# Patient Record
Sex: FEMALE | Race: BLACK OR AFRICAN AMERICAN | NOT HISPANIC OR LATINO | Employment: UNEMPLOYED | URBAN - METROPOLITAN AREA
[De-identification: names, ages, dates, MRNs, and addresses within clinical notes are randomized per-mention and may not be internally consistent; named-entity substitution may affect disease eponyms.]

---

## 2019-02-05 ENCOUNTER — HOSPITAL ENCOUNTER (EMERGENCY)
Facility: MEDICAL CENTER | Age: 24
End: 2019-02-05
Attending: EMERGENCY MEDICINE
Payer: COMMERCIAL

## 2019-02-05 VITALS
HEIGHT: 60 IN | HEART RATE: 85 BPM | OXYGEN SATURATION: 100 % | RESPIRATION RATE: 16 BRPM | SYSTOLIC BLOOD PRESSURE: 111 MMHG | DIASTOLIC BLOOD PRESSURE: 65 MMHG | WEIGHT: 102.51 LBS | TEMPERATURE: 98.8 F | BODY MASS INDEX: 20.13 KG/M2

## 2019-02-05 DIAGNOSIS — K04.7 DENTAL ABSCESS: ICD-10-CM

## 2019-02-05 PROCEDURE — 99283 EMERGENCY DEPT VISIT LOW MDM: CPT

## 2019-02-05 RX ORDER — HYDROCODONE BITARTRATE AND ACETAMINOPHEN 5; 325 MG/1; MG/1
1 TABLET ORAL EVERY 4 HOURS PRN
Qty: 10 TAB | Refills: 0 | Status: SHIPPED | OUTPATIENT
Start: 2019-02-05 | End: 2019-02-10

## 2019-02-05 RX ORDER — PENICILLIN V POTASSIUM 500 MG/1
500 TABLET ORAL EVERY 6 HOURS
Qty: 40 TAB | Refills: 0 | Status: SHIPPED | OUTPATIENT
Start: 2019-02-05 | End: 2019-02-15

## 2019-02-05 NOTE — ED PROVIDER NOTES
"ED Provider Note    CHIEF COMPLAINT  Chief Complaint   Patient presents with   • Dental Pain   • Jaw Swelling       [facility]  Ra Weinstein is a 23 y.o. female who presents for evaluation of jaw swelling.  The patient presents with 2 days of progressive pain to the right lower jaw area.  Patient states that she has had problems with her teeth in the past but is \"afraid to go see a dentist\".  Patient denies associated: Fever, chills, URI symptoms, cardiorespiratory symptoms, gastrointestinal symptoms.  No other acute symptomatology or complaints.    REVIEW OF SYSTEMS  See HPI for further details.  She denies major health problems such as: Diabetes, seizures, cardiopulmonary disorders, gastrointestinal disorders.  She does relate a history of asthma.    PAST MEDICAL HISTORY  Past Medical History:   Diagnosis Date   • Asthma        FAMILY HISTORY  History reviewed. No pertinent family history.    SOCIAL HISTORY  Positive tobacco use; denies alcohol or drug use;    SURGICAL HISTORY  History reviewed. No pertinent surgical history.    CURRENT MEDICATIONS  See nurse's notes    ALLERGIES  No Known Allergies    PHYSICAL EXAM  VITAL SIGNS: /65   Pulse 85   Temp 37.1 °C (98.8 °F) (Temporal)   Resp 16   Ht 1.524 m (5')   Wt 46.5 kg (102 lb 8.2 oz)   SpO2 100%   BMI 20.02 kg/m²    Constitutional: 23-year-old female, well developed, Well nourished, No acute distress, Non-toxic appearance.   HENT: Normocephalic, Atraumatic, Nares:Clear, Oropharynx: moist, well hydrated, posterior pharynx:clear; the patient has swelling of the right lower jaw line area; the patient has tenderness to tooth #28 and 29 with associated gingival swelling but no fluctuance; no trismus; no sublingular edema;  Eyes: PERRL, EOMI, Conjunctiva normal, No discharge.   Neck: Normal range of motion, No tenderness, Supple, No stridor.   Lymphatic: No lymphadenopathy noted.   Cardiovascular: Regular rate and rhythm without mumurs, gallups, rubs "   Thorax & Lungs: Normal Equal breath sounds, No respiratory distress, No wheezing, no stridor, no rales. No chest tenderness.   Skin: Good skin turgor, pink, warm, dry. No rashes, petechiae, purpura. Normal capillary refill.   Neurologic: Alert & oriented x 3,  No gross focal deficits noted.   Psychiatric: Affect normal, Judgment normal, Mood normal.     COURSE & MEDICAL DECISION MAKING  Pertinent Labs & Imaging studies reviewed. (See chart for details)  Discussion: At this time, the patient has findings consistent with uncomplicated dental infection.  I see no evidence of deep space infection or sublingular infection.  I discussed the findings and treatment plan with the patient.  The patient lives in [place] is planning on returning home to have more definitive treatment initiated.  She indicates she is comfortable with this explanation disposition.    FINAL IMPRESSION  1. Dental abscess           PLAN  1.  Appropriate discharge instructions given  2.  Pen-Vee K 500 mg #40  3.  Lortab 5 mg #10;  databank reviewed; informed consent obtained;    Electronically signed by: Guy G Gansert, 2/5/2019 9:22 AM

## 2019-02-05 NOTE — ED NOTES
Discharge instructions given and signed by patient, narcotic form signed by patient, told to follow up with dentist/PCP or to return to ED for new or concerning symptoms

## 2019-02-05 NOTE — ED TRIAGE NOTES
Chief Complaint   Patient presents with   • Dental Pain   • Jaw Swelling   Pt to triage in NAD.  Pt reports she has a broken tooth.  Pt with right jaw swelling.  Pt able to swallow.  Managing secretions.  Pt reports she lives in [place] and has a dentist but is unable to get home due to snow.  Pt educated on triage process and instructed to notify triage RN of any change in status.

## 2019-02-07 ENCOUNTER — HOSPITAL ENCOUNTER (EMERGENCY)
Facility: MEDICAL CENTER | Age: 24
End: 2019-02-07
Attending: EMERGENCY MEDICINE
Payer: COMMERCIAL

## 2019-02-07 VITALS
DIASTOLIC BLOOD PRESSURE: 67 MMHG | HEIGHT: 60 IN | BODY MASS INDEX: 20.03 KG/M2 | WEIGHT: 102 LBS | OXYGEN SATURATION: 97 % | RESPIRATION RATE: 16 BRPM | HEART RATE: 87 BPM | TEMPERATURE: 98.8 F | SYSTOLIC BLOOD PRESSURE: 110 MMHG

## 2019-02-07 DIAGNOSIS — K08.89 PAIN, DENTAL: ICD-10-CM

## 2019-02-07 PROCEDURE — A9270 NON-COVERED ITEM OR SERVICE: HCPCS | Performed by: EMERGENCY MEDICINE

## 2019-02-07 PROCEDURE — 99283 EMERGENCY DEPT VISIT LOW MDM: CPT

## 2019-02-07 PROCEDURE — 700102 HCHG RX REV CODE 250 W/ 637 OVERRIDE(OP): Performed by: EMERGENCY MEDICINE

## 2019-02-07 RX ORDER — HYDROCODONE BITARTRATE AND ACETAMINOPHEN 5; 325 MG/1; MG/1
1 TABLET ORAL ONCE
Status: COMPLETED | OUTPATIENT
Start: 2019-02-07 | End: 2019-02-07

## 2019-02-07 RX ADMIN — HYDROCODONE BITARTRATE AND ACETAMINOPHEN 1 TABLET: 5; 325 TABLET ORAL at 06:10

## 2019-02-07 NOTE — ED TRIAGE NOTES
Ra Ledezma Js  23 y.o. female  Chief Complaint   Patient presents with   • Dental Pain     swelling to lower mandible, seen on 2/5 with Rx Abx, pt states was local to R side only, now there is swelling to both   /67   Pulse 87   Temp 37.1 °C (98.8 °F) (Temporal)   Resp 16   Ht 1.524 m (5')   Wt 46.3 kg (102 lb)   LMP 01/12/2019   SpO2 97%   BMI 19.92 kg/m²   =  Pt amb to triage with steady gait for above complaint. No other medical complaints, denies fever chills N/V/D  Pt is alert and oriented, speaking in full sentences, follows commands and responds appropriately to questions. NAD. Resp are even and unlabored.  Pt placed in lobby. Pt educated on triage process. Pt encouraged to alert staff for any changes.

## 2019-02-07 NOTE — ED PROVIDER NOTES
ED Provider Note      CHIEF COMPLAINT  Chief Complaint   Patient presents with   • Dental Pain     swelling to lower mandible, seen on 2/5 with Rx Abx, pt states was local to R side only, now there is swelling to both       HPI  Ra Weinstein is a 23 y.o. female who presents with dental pain.  She has had pain in her right jaw for a long time.  She has been applying over-the-counter filling to her right mandibular molar.  She developed swelling 2 days ago.  Was seen in the ER.  Placed on penicillin.  She took this yesterday.  This morning she woke up and felt like the swelling over the jaw was improved although she felt like her left lower tooth hurt as well.  She has not had fever.  No swelling underneath the mandible.  No difficulty breathing or swallowing.  Because of the pain she has not been brushing her teeth.  She is still drinking liquids and eating.  She has been afraid to see a dentist for some time, but she plans to see a dentist in [place] this week.    REVIEW OF SYSTEMS      See HPI    PAST MEDICAL HISTORY  Past Medical History:   Diagnosis Date   • Asthma        SOCIAL HISTORY  Social History   Substance Use Topics   • Smoking status: Light Tobacco Smoker     Types: Cigarettes   • Smokeless tobacco: Never Used   • Alcohol use No       ALLERGIES  No Known Allergies    PHYSICAL EXAM  VITAL SIGNS: /67   Pulse 87   Temp 37.1 °C (98.8 °F) (Temporal)   Resp 16   Ht 1.524 m (5')   Wt 46.3 kg (102 lb)   LMP 01/12/2019   SpO2 97%   BMI 19.92 kg/m²   Constitutional: Well developed, Well nourished, No acute distress, Non-toxic appearance.   HENT:  Atraumatic, Normocephalic. Bilateral external ears normal, there is swelling over the left side of the mandible.  There is tenderness of all her left mandibular teeth.  There is no discrete periapical abscess.  No tongue elevation.  There is no facial cellulitis.  Eyes: Grossly Normal inspection  Neck: Normal range of motion  Cardiovascular: Normal  heart rate  Thorax & Lungs: No respiratory distress  Skin: No rash.     COURSE & MEDICAL DECISION MAKING  Patient with dental caries with secondary infection.  She is only taken antibiotics for 1 day now.  She reports that she feels like the swelling is getting better.  She does not have sublingual swelling or ludwigs.  No systemic illness or toxicity.  No drainable abscess.  At this point the best course is to continue antibiotics and symptomatic management.  Advised Tylenol and/or ibuprofen as needed for pain.  Given one Norco while here.  She will continue her penicillin.  Apply warm compresses and gentle massage the area.  I have asked her to see a dentist within the next couple days to arrange for definitive care of her dental caries.  I cautioned her to return to the ER for fevers, skin rash, swelling underneath the tongue, worsening, not improving or concerned.      FINAL IMPRESSION  1. dental caries with secondary infection      This dictation was created using voice recognition software. The accuracy of the dictation is limited to the abilities of the software.   The nursing notes were reviewed and certain aspects of this information were incorporated into this note.      Electronically signed by: Nilson Argueta, 2/7/2019 9:13 AM

## 2019-10-03 ENCOUNTER — HOSPITAL ENCOUNTER (EMERGENCY)
Facility: MEDICAL CENTER | Age: 24
End: 2019-10-03
Attending: EMERGENCY MEDICINE
Payer: COMMERCIAL

## 2019-10-03 VITALS
BODY MASS INDEX: 26.19 KG/M2 | DIASTOLIC BLOOD PRESSURE: 69 MMHG | RESPIRATION RATE: 12 BRPM | WEIGHT: 133.38 LBS | HEART RATE: 93 BPM | HEIGHT: 60 IN | TEMPERATURE: 97.3 F | SYSTOLIC BLOOD PRESSURE: 106 MMHG | OXYGEN SATURATION: 100 %

## 2019-10-03 DIAGNOSIS — R09.81 NASAL CONGESTION: ICD-10-CM

## 2019-10-03 PROCEDURE — 99282 EMERGENCY DEPT VISIT SF MDM: CPT

## 2019-10-04 NOTE — ED NOTES
DC home with written and verbal instructions regarding f/u, activity.  Verbalized understanding, ambulated out.

## 2019-10-04 NOTE — ED PROVIDER NOTES
ER Provider Note     Scribed for Rigoberto Vance M.D. by Dequan Schneider. 10/3/2019, 8:07 PM.    Primary Care Provider: None noted  Means of Arrival: Walk-in   History obtained from: Patient  History limited by: None     CHIEF COMPLAINT  Chief Complaint   Patient presents with   • Cold Symptoms     runny nose, congestion. feels like her asthma flared up.    • Medication Refill     out of albuterol inhaler   • Pregnancy     36 weeks       HPI  Ra Weinstein is a 24 y.o. female who is 36 weeks pregnant and presents to the Emergency Department complaining of runny nose and nasal congestion onset a couple days ago. Patient states she has been having cold-like symptoms with a couple of days but it has been getting harder for her to breathe due to her nasal congestion. She denies any associated shortness of breath, wheezing, or fevers.  She also notes she would like a prescription refill for her albuterol inhaler, which she was prescribed a couple weeks ago for her asthma. However, patient reports her current symptoms are due to her nasal congestion and not from asthma exacerbation. She reports she is on her second pregnancy.    REVIEW OF SYSTEMS  See HPI for further details.     PAST MEDICAL HISTORY   has a past medical history of Asthma and Hypertension.    SURGICAL HISTORY  patient denies any surgical history    SOCIAL HISTORY  Social History     Tobacco Use   • Smoking status: Light Tobacco Smoker     Types: Cigarettes   • Smokeless tobacco: Never Used   Substance Use Topics   • Alcohol use: No   • Drug use: No      Social History     Substance and Sexual Activity   Drug Use No       FAMILY HISTORY  History reviewed. No pertinent family history.    CURRENT MEDICATIONS  Home Medications     Reviewed by Lorene Cortez R.N. (Registered Nurse) on 10/03/19 at [facility]  Med List Status: Partial   Medication Last Dose Status        Patient [place] Taking any Medications                       ALLERGIES  No Known  Allergies    PHYSICAL EXAM  VITAL SIGNS: /69   Pulse 93   Temp 36.3 °C (97.3 °F) (Temporal)   Resp 12   Ht 1.524 m (5')   Wt 60.5 kg (133 lb 6.1 oz)   LMP 02/12/2019   SpO2 100%   BMI 26.05 kg/m²      Constitutional: Alert in no apparent distress.  HENT: No signs of trauma, Bilateral external ears normal, Nasal congestion.   Eyes: Pupils are equal and reactive, Conjunctiva normal, Non-icteric.   Neck: Normal range of motion, No tenderness, Supple, No stridor.   Lymphatic: No lymphadenopathy noted.   Cardiovascular: Regular rate and rhythm, no murmurs.   Thorax & Lungs: Normal breath sounds, No respiratory distress, No wheezing, No chest tenderness. Clear lungs.   Abdomen: Bowel sounds normal, Soft, No tenderness, gravid uterus.  No pulsatile masses. No peritoneal signs.  Skin: Warm, Dry, No erythema, No rash.   Back: No bony tenderness, No CVA tenderness.   Extremities: Intact distal pulses, No edema, No tenderness, No cyanosis.  Musculoskeletal: Good range of motion in all major joints. No tenderness to palpation or major deformities noted.   Neurologic: Alert , Normal motor function, Normal sensory function, No focal deficits noted.   Psychiatric: Affect normal, Judgment normal, Mood normal.     COURSE & MEDICAL DECISION MAKING  Pertinent Labs & Imaging studies reviewed. (See chart for details)    This is a 24 y.o. female that presents runny nose and nasal congestion onset a couple days ago.  The patient is otherwise well-appearing.  I will recommend nose drops to help with congestion.    8:07 PM - Patient seen and examined at bedside. Discussed with patient that many medications for her symptoms can be dangerous for pregnancy. Recommended using saline drops. Discussed plan of discharge as outlined below and patient was given the opportunity to ask questions. Patient understands and is comfortable with plan.     The patient will return for new or worsening symptoms and is stable at the time of  discharge.    DISPOSITION:  Patient will be discharged home in stable condition.    FOLLOW UP:  [facility]  [address] [address]  [phone]    In 2 days      FINAL IMPRESSION  1. Nasal congestion          Dequan MCKEON (Scribe), am scribing for, and in the presence of, Rigoberto Vance M.D..    Electronically signed by: Dequan Schneider (Scribe), 10/3/2019    IRigoberto M.D. personally performed the services described in this documentation, as scribed by Dequan Schneider in my presence, and it is both accurate and complete.     E    The note accurately reflects work and decisions made by me.  Rigoberto Vance  10/3/2019  11:08 PM

## 2019-10-04 NOTE — ED NOTES
Pt roomed in 75.  RN agree with triage note, pt having SOB/congestion difficulty breath that worsens when she lays flat due to drainage and chest heaviness.  Pt visiting from [place] and doesn't have albuterol inhaler with her.

## 2019-11-14 ENCOUNTER — HOSPITAL ENCOUNTER (INPATIENT)
Facility: MEDICAL CENTER | Age: 24
LOS: 2 days | End: 2019-11-16
Attending: OBSTETRICS & GYNECOLOGY | Admitting: OBSTETRICS & GYNECOLOGY
Payer: COMMERCIAL

## 2019-11-14 LAB
BASOPHILS # BLD AUTO: 0.2 % (ref 0–1.8)
BASOPHILS # BLD: 0.02 K/UL (ref 0–0.12)
EOSINOPHIL # BLD AUTO: 0.03 K/UL (ref 0–0.51)
EOSINOPHIL NFR BLD: 0.2 % (ref 0–6.9)
ERYTHROCYTE [DISTWIDTH] IN BLOOD BY AUTOMATED COUNT: 40.1 FL (ref 35.9–50)
ERYTHROCYTE [DISTWIDTH] IN BLOOD BY AUTOMATED COUNT: 43.6 FL (ref 35.9–50)
HCT VFR BLD AUTO: 32.7 % (ref 37–47)
HCT VFR BLD AUTO: 39.7 % (ref 37–47)
HGB BLD-MCNC: 10.8 G/DL (ref 12–16)
HGB BLD-MCNC: 12.3 G/DL (ref 12–16)
HOLDING TUBE BB 8507: NORMAL
IMM GRANULOCYTES # BLD AUTO: 0.08 K/UL (ref 0–0.11)
IMM GRANULOCYTES NFR BLD AUTO: 0.6 % (ref 0–0.9)
LYMPHOCYTES # BLD AUTO: 1.72 K/UL (ref 1–4.8)
LYMPHOCYTES NFR BLD: 13.4 % (ref 22–41)
MCH RBC QN AUTO: 28 PG (ref 27–33)
MCH RBC QN AUTO: 28.3 PG (ref 27–33)
MCHC RBC AUTO-ENTMCNC: 31 G/DL (ref 33.6–35)
MCHC RBC AUTO-ENTMCNC: 33 G/DL (ref 33.6–35)
MCV RBC AUTO: 85.8 FL (ref 81.4–97.8)
MCV RBC AUTO: 90.4 FL (ref 81.4–97.8)
MONOCYTES # BLD AUTO: 1.1 K/UL (ref 0–0.85)
MONOCYTES NFR BLD AUTO: 8.5 % (ref 0–13.4)
NEUTROPHILS # BLD AUTO: 9.92 K/UL (ref 2–7.15)
NEUTROPHILS NFR BLD: 77.1 % (ref 44–72)
NRBC # BLD AUTO: 0 K/UL
NRBC BLD-RTO: 0 /100 WBC
PATHOLOGY CONSULT NOTE: NORMAL
PLATELET # BLD AUTO: 167 K/UL (ref 164–446)
PLATELET # BLD AUTO: 199 K/UL (ref 164–446)
PMV BLD AUTO: 12.5 FL (ref 9–12.9)
PMV BLD AUTO: 12.7 FL (ref 9–12.9)
RBC # BLD AUTO: 3.81 M/UL (ref 4.2–5.4)
RBC # BLD AUTO: 4.39 M/UL (ref 4.2–5.4)
WBC # BLD AUTO: 12.9 K/UL (ref 4.8–10.8)
WBC # BLD AUTO: 16.3 K/UL (ref 4.8–10.8)

## 2019-11-14 PROCEDURE — 88307 TISSUE EXAM BY PATHOLOGIST: CPT

## 2019-11-14 PROCEDURE — 59409 OBSTETRICAL CARE: CPT

## 2019-11-14 PROCEDURE — 36415 COLL VENOUS BLD VENIPUNCTURE: CPT

## 2019-11-14 PROCEDURE — 85025 COMPLETE CBC W/AUTO DIFF WBC: CPT

## 2019-11-14 PROCEDURE — 700111 HCHG RX REV CODE 636 W/ 250 OVERRIDE (IP)

## 2019-11-14 PROCEDURE — 304965 HCHG RECOVERY SERVICES

## 2019-11-14 PROCEDURE — 700111 HCHG RX REV CODE 636 W/ 250 OVERRIDE (IP): Performed by: STUDENT IN AN ORGANIZED HEALTH CARE EDUCATION/TRAINING PROGRAM

## 2019-11-14 PROCEDURE — A9270 NON-COVERED ITEM OR SERVICE: HCPCS | Performed by: STUDENT IN AN ORGANIZED HEALTH CARE EDUCATION/TRAINING PROGRAM

## 2019-11-14 PROCEDURE — 700102 HCHG RX REV CODE 250 W/ 637 OVERRIDE(OP): Performed by: STUDENT IN AN ORGANIZED HEALTH CARE EDUCATION/TRAINING PROGRAM

## 2019-11-14 PROCEDURE — 700105 HCHG RX REV CODE 258: Performed by: STUDENT IN AN ORGANIZED HEALTH CARE EDUCATION/TRAINING PROGRAM

## 2019-11-14 PROCEDURE — 85027 COMPLETE CBC AUTOMATED: CPT

## 2019-11-14 PROCEDURE — 59409 OBSTETRICAL CARE: CPT | Performed by: OBSTETRICS & GYNECOLOGY

## 2019-11-14 PROCEDURE — 770002 HCHG ROOM/CARE - OB PRIVATE (112)

## 2019-11-14 PROCEDURE — 302151 K-PAD 14X20: Performed by: OBSTETRICS & GYNECOLOGY

## 2019-11-14 RX ORDER — IBUPROFEN 800 MG/1
800 TABLET ORAL EVERY 6 HOURS PRN
Status: DISCONTINUED | OUTPATIENT
Start: 2019-11-14 | End: 2019-11-14

## 2019-11-14 RX ORDER — ONDANSETRON 4 MG/1
4 TABLET, ORALLY DISINTEGRATING ORAL EVERY 6 HOURS PRN
Status: DISCONTINUED | OUTPATIENT
Start: 2019-11-14 | End: 2019-11-16 | Stop reason: HOSPADM

## 2019-11-14 RX ORDER — MISOPROSTOL 200 UG/1
600 TABLET ORAL
Status: DISCONTINUED | OUTPATIENT
Start: 2019-11-14 | End: 2019-11-16 | Stop reason: HOSPADM

## 2019-11-14 RX ORDER — ACETAMINOPHEN 325 MG/1
650 TABLET ORAL EVERY 4 HOURS PRN
Status: DISCONTINUED | OUTPATIENT
Start: 2019-11-14 | End: 2019-11-16 | Stop reason: HOSPADM

## 2019-11-14 RX ORDER — ACETAMINOPHEN 325 MG/1
325 TABLET ORAL EVERY 4 HOURS PRN
Status: DISCONTINUED | OUTPATIENT
Start: 2019-11-14 | End: 2019-11-16 | Stop reason: HOSPADM

## 2019-11-14 RX ORDER — ROPIVACAINE HYDROCHLORIDE 2 MG/ML
INJECTION, SOLUTION EPIDURAL; INFILTRATION; PERINEURAL
Status: COMPLETED
Start: 2019-11-14 | End: 2019-11-14

## 2019-11-14 RX ORDER — IBUPROFEN 600 MG/1
600 TABLET ORAL EVERY 6 HOURS PRN
Status: DISCONTINUED | OUTPATIENT
Start: 2019-11-14 | End: 2019-11-16 | Stop reason: HOSPADM

## 2019-11-14 RX ORDER — SODIUM CHLORIDE, SODIUM LACTATE, POTASSIUM CHLORIDE, CALCIUM CHLORIDE 600; 310; 30; 20 MG/100ML; MG/100ML; MG/100ML; MG/100ML
INJECTION, SOLUTION INTRAVENOUS CONTINUOUS
Status: DISPENSED | OUTPATIENT
Start: 2019-11-14 | End: 2019-11-14

## 2019-11-14 RX ORDER — MISOPROSTOL 200 UG/1
800 TABLET ORAL
Status: DISCONTINUED | OUTPATIENT
Start: 2019-11-14 | End: 2019-11-14 | Stop reason: HOSPADM

## 2019-11-14 RX ORDER — SODIUM CHLORIDE, SODIUM LACTATE, POTASSIUM CHLORIDE, CALCIUM CHLORIDE 600; 310; 30; 20 MG/100ML; MG/100ML; MG/100ML; MG/100ML
INJECTION, SOLUTION INTRAVENOUS PRN
Status: DISCONTINUED | OUTPATIENT
Start: 2019-11-14 | End: 2019-11-16 | Stop reason: HOSPADM

## 2019-11-14 RX ORDER — ONDANSETRON 2 MG/ML
4 INJECTION INTRAMUSCULAR; INTRAVENOUS EVERY 6 HOURS PRN
Status: DISCONTINUED | OUTPATIENT
Start: 2019-11-14 | End: 2019-11-16 | Stop reason: HOSPADM

## 2019-11-14 RX ORDER — ALUMINA, MAGNESIA, AND SIMETHICONE 2400; 2400; 240 MG/30ML; MG/30ML; MG/30ML
30 SUSPENSION ORAL EVERY 6 HOURS PRN
Status: DISCONTINUED | OUTPATIENT
Start: 2019-11-14 | End: 2019-11-14 | Stop reason: HOSPADM

## 2019-11-14 RX ORDER — OXYCODONE HYDROCHLORIDE AND ACETAMINOPHEN 5; 325 MG/1; MG/1
1 TABLET ORAL ONCE
Status: COMPLETED | OUTPATIENT
Start: 2019-11-15 | End: 2019-11-15

## 2019-11-14 RX ORDER — METHYLERGONOVINE MALEATE 0.2 MG/ML
0.2 INJECTION INTRAVENOUS
Status: DISCONTINUED | OUTPATIENT
Start: 2019-11-14 | End: 2019-11-14 | Stop reason: HOSPADM

## 2019-11-14 RX ADMIN — ACETAMINOPHEN 650 MG: 325 TABLET, FILM COATED ORAL at 06:30

## 2019-11-14 RX ADMIN — Medication 125 ML/HR: at 04:30

## 2019-11-14 RX ADMIN — FENTANYL CITRATE 100 MCG: 50 INJECTION, SOLUTION INTRAMUSCULAR; INTRAVENOUS at 02:25

## 2019-11-14 RX ADMIN — IBUPROFEN 600 MG: 600 TABLET ORAL at 11:17

## 2019-11-14 RX ADMIN — IBUPROFEN 600 MG: 600 TABLET ORAL at 18:37

## 2019-11-14 RX ADMIN — ACETAMINOPHEN 650 MG: 325 TABLET, FILM COATED ORAL at 21:29

## 2019-11-14 RX ADMIN — SODIUM CHLORIDE, POTASSIUM CHLORIDE, SODIUM LACTATE AND CALCIUM CHLORIDE: 600; 310; 30; 20 INJECTION, SOLUTION INTRAVENOUS at 02:10

## 2019-11-14 RX ADMIN — ONDANSETRON 4 MG: 2 INJECTION INTRAMUSCULAR; INTRAVENOUS at 05:06

## 2019-11-14 RX ADMIN — IBUPROFEN 600 MG: 600 TABLET ORAL at 04:30

## 2019-11-14 RX ADMIN — Medication 999 ML/HR: at 03:20

## 2019-11-14 SDOH — ECONOMIC STABILITY: FOOD INSECURITY: WITHIN THE PAST 12 MONTHS, YOU WORRIED THAT YOUR FOOD WOULD RUN OUT BEFORE YOU GOT MONEY TO BUY MORE.: PATIENT DECLINED

## 2019-11-14 SDOH — ECONOMIC STABILITY: FOOD INSECURITY: WITHIN THE PAST 12 MONTHS, THE FOOD YOU BOUGHT JUST DIDN'T LAST AND YOU DIDN'T HAVE MONEY TO GET MORE.: PATIENT DECLINED

## 2019-11-14 SDOH — ECONOMIC STABILITY: TRANSPORTATION INSECURITY
IN THE PAST 12 MONTHS, HAS THE LACK OF TRANSPORTATION KEPT YOU FROM MEDICAL APPOINTMENTS OR FROM GETTING MEDICATIONS?: PATIENT DECLINED

## 2019-11-14 SDOH — ECONOMIC STABILITY: TRANSPORTATION INSECURITY
IN THE PAST 12 MONTHS, HAS LACK OF TRANSPORTATION KEPT YOU FROM MEETINGS, WORK, OR FROM GETTING THINGS NEEDED FOR DAILY LIVING?: PATIENT DECLINED

## 2019-11-14 ASSESSMENT — LIFESTYLE VARIABLES
EVER_SMOKED: NEVER
ALCOHOL_USE: NO

## 2019-11-14 ASSESSMENT — PATIENT HEALTH QUESTIONNAIRE - PHQ9
SUM OF ALL RESPONSES TO PHQ9 QUESTIONS 1 AND 2: 0
2. FEELING DOWN, DEPRESSED, IRRITABLE, OR HOPELESS: NOT AT ALL
1. LITTLE INTEREST OR PLEASURE IN DOING THINGS: NOT AT ALL

## 2019-11-14 NOTE — DISCHARGE PLANNING
Discharge Planning Assessment Post Partum    Reason for Referral: Walk-in and baby tested positive for THC  Address: [address] [place] [id number]  Phone: [phone]  Type of Living Situation: living in [place] with her mother and daughter  Mom Diagnosis: Pregnancy  Baby Diagnosis: Steens-39.2 weeks  Primary Language: English    Name of Baby: Still deciding but is thinking of naming her Loreto Avitia (: 19)  Father of the Baby: Acosta Avitia  Involved in baby’s care? Yes    Prenatal Care: Yes, in [place]  Mom's PCP: Unknown  PCP for new baby:Dr. Kavin Douglas    Support System: FOB and Maternal Grandmother  Coping/Bonding between mother & baby: Yes  Source of Feeding: breast  Supplies for Infant: states she has a car seat and all the baby's supplies in [place].  States FOB will be purchasing a car seat and getting basic supplies while she is here in [place]    Mom's Insurance: Nisreen CMSNAREN  Baby Covered on Insurance:Yes  Mother Employed/School: Not currently  Other children in the home/names & ages: 4 year old daughter-Shine Mendes (4/10/15)    Financial Hardship/Income: denies   Mom's Mental status: alert and oriented  Services used prior to admit: has WIC in [place]    CPS History: Report called in to [facility] CPS at [phone].  Reported information to pedro George #[id number].  Report is taken as information only.  Psychiatric History: No  Domestic Violence History: No  Drug/ETOH History: Yes, admits to using marijuana during the beginning of the pregnancy.  States she last used in May 2019.  Infant's UDS is positive for THC.    Resources Provided: pediatrician list, children and family resource list, counseling resources for post partum depression  Referrals Made: diaper bank referral provided     Clearance for Discharge: Report made to [facility] CPS.  Report is information only.  Infant is cleared to discharge home with mother.    Ongoing Plan: Spoke with MOB who stated she  eventually plans on moving to [place] to be with FOB.  Provided MOB with a packet of resources for baby supplies in [place].

## 2019-11-14 NOTE — PROGRESS NOTES
0700 Received report from ROXANNE England. Discussed plan of care. Patient will call for pain medication as needed. All needs met at this time.

## 2019-11-14 NOTE — PROGRESS NOTES
0215_ Assumed pt care. Report from HITESH Haile RN. Pt transferred to S222 from LDA 2 by christofer. Pt desires an epidural. IV bolus started.  0247_ SROM thick mec. SVE 5-6/100/-1  0310_ Pt feels pressure. SVE complete/+2. Dr Moore called for delivery.  0315_  viable female apgars 8/8  0319_ Placenta delivered S/I. Sent to pathology per MD orders.  0515_ PT up to the bathroom and libby care done. Pt transferred to PP in stable condition. Report to ROXANNE England.

## 2019-11-14 NOTE — H&P
History and Physical      Ra Weinstein is a 24 y.o. year old female  at 39w2d reported by the patient who presents to triage with painful contractions. She started having contractions a couple days ago but they have continued to become more painful and closer together. She is has also been having nausea and vomiting tonight, as well as chills.     She is visiting from [place], where she received her prenatal care. We do not have nay of her records at this time but she reports that there have not been any complications in this pregnancy. She denies any tobacco, alcohol, or drug use.     Subjective:   positive  For CTXS.   positive Feels pain   negative for LOF  negative for vaginal bleeding.   positive for fetal movement    ROS: Patient denies fever, headache, visual disturbance, swelling of hands/face and dysuria.    Past Medical History:   Diagnosis Date   • Asthma    • Hypertension      No past surgical history on file.  OB History    Para Term  AB Living   5 1     3 1   SAB TAB Ectopic Molar Multiple Live Births                    # Outcome Date GA Lbr Zach/2nd Weight Sex Delivery Anes PTL Lv   5 Current            4 AB            3 AB            2 AB            1 Para              GYN History:  Menarche at age 15, every 28-30 days, lasting 7 days. Denies history of fibroids, STIs requiring treatment, herpes, and heavy periods  Social History     Socioeconomic History   • Marital status: Single     Spouse name: Not on file   • Number of children: Not on file   • Years of education: Not on file   • Highest education level: Not on file   Occupational History   • Not on file   Social Needs   • Financial resource strain: Not on file   • Food insecurity:     Worry: Not on file     Inability: Not on file   • Transportation needs:     Medical: Not on file     Non-medical: Not on file   Tobacco Use   • Smoking status: Light Tobacco Smoker     Types: Cigarettes   • Smokeless tobacco: Never  Used   Substance and Sexual Activity   • Alcohol use: No   • Drug use: No   • Sexual activity: Not on file   Lifestyle   • Physical activity:     Days per week: Not on file     Minutes per session: Not on file   • Stress: Not on file   Relationships   • Social connections:     Talks on phone: Not on file     Gets together: Not on file     Attends Mu-ism service: Not on file     Active member of club or organization: Not on file     Attends meetings of clubs or organizations: Not on file     Relationship status: Not on file   • Intimate partner violence:     Fear of current or ex partner: Not on file     Emotionally abused: Not on file     Physically abused: Not on file     Forced sexual activity: Not on file   Other Topics Concern   • Not on file   Social History Narrative   • Not on file     Allergies: Patient has no known allergies.  No current facility-administered medications on file prior to encounter.      No current outpatient medications on file prior to encounter.         Objective:      /69   Pulse 83   Temp 36 °C (96.8 °F) (Temporal)   Resp 18   Ht 1.524 m (5')   Wt 54.4 kg (120 lb)     General: Very uncomfortable sitting in bed.  HEENT: normocephalic, nontraumatic, EOMI  Cardiovascular: Heart RRR with no murmurs, rubs or gallops.  Respiratory: symmetric chest expansion, lungs CTAB, with no wheezes, rales, rhonci  Abdomen: gravid, nontender  Musculoskeletal: strength 5/5 in four extremities  Neuro: non focal with no numbness, tingling or changes in sensation    Wolsey: Uterine Contractions Q1-2 minutes.   FHRM: Baseline 125, Accels to 150, variable decels to 60, moderate variability  Presentation: vertex  Cervix:  4cm/60%/-2      Lab Review  Lab:   Blood type: pending    HBsAg: pending   HIV: pending   GC: pending   Chlamydia: pending    Rubella: pending   GBS: unknown  1 hr GTT: unknown          Invalid input(s):  ABSCRN,  CBC PLTDF,  HEMOGLOBIN,  PLATELETCT,  HBA1C,  MHLIMTP1IW, HIV,  CHLAM    No results for input(s): WBC, RBC, HEMOGLOBIN, HEMATOCRIT, MCV, MCH, RDW, PLATELETCT, MPV, NEUTSPOLYS, LYMPHOCYTES, MONOCYTES, EOSINOPHILS, BASOPHILS, RBCMORPHOLO in the last 72 hours.  No results for input(s): SODIUM, POTASSIUM, CHLORIDE, CO2, GLUCOSE, BUN, CPKTOTAL in the last 72 hours.        Assessment/Plan:   Ra Weinstein is a 24 y.o. year old female  at 39w2d dated by her report who presents with painful contractions. She made cervical change from 3cm to 4cm within 30 minutes and she is in a significant amount of discomfort. We will admit to L&D and anticipate .    I discussed this patient with the attending physician, Dr. Moore, who agreed with the admission and management plan.    Hardeep Colon MD  [facility], PGY-1

## 2019-11-14 NOTE — L&D DELIVERY NOTE
Normal Spontaneous Vaginal Delivery    Date of procedure: 2019  PreOp Dx: 39w2d wk IUP in active labor with spontaneously ruptured membranes  PostOp Dx: Same with delivery of a viable female infant at 0315 hours weighing TBA with APGARS of 8 and 8 and 1 and 5min respectively.  Procedure: Spontaneous vaginal delivery  Surgeon: Homar Moore DO  Assistants: none  Anesthesia: None  EBL: 400 cc  Indications: This is a 24 y.o.  at 39w2d weeks by LMP with an EDC of Estimated Date of Delivery: 19 who presented to labor and delivery in active labor. Her prenatal course was uncomplicated. Prenatal care was received in [facility].  Records were requested.   Labor course: The patient initially presented to labor and delivery triage at 1:15 AM complaining of uterine contractions.  Sterile vaginal exam revealed 3/60/-2.  Reexamination of the patient cervix and hour later revealed her to be 4 cm dilated.  She was admitted for epidural pain management.  She progressed quickly to completely dilated with spontaneous rupture and began uncontrollably pushing with delivery of viable infant at 0300 hrs. as documented below.    Procedure: The patient was noted to be complete so was placed in dorsal lithotomy position, prepped and draped in the usual sterile fashion for delivery. The patient was asked to push and the head was delivered spontaneously in the cephalic OA position over and intact perineum. A nuchal cord was checked and none was found. The anterior shoulder delivered easily and the posterior shoulder followed. The remainder of the infant was easily delivered and the oropharynx and nasopharynx was bulb suctioned. The infant was noted to have spontaneous cry and spontaneous movement of all four extremities. The cord was clamped x 2 and cut - it was noted to have 3 vessels. The infant was passed to the warming crib and  nursing/NICU personnel was in attendance. The placeta delivered intact  spontaneously and the uterus was evacuated of clots. Pitocin 20 units in 1L was started to firm the uterus. Examination of cervix and vaginal vault revealed no laceration. The patient tolerated this procedure well and recovered in L&D with her infant. All sponge, needle, and instrument counts were correct.         -------------------------------------------------------------------------------------------------------------  Homar Moore DO

## 2019-11-14 NOTE — PROGRESS NOTES
edc   39.2      Complaints: uc's    0115: pt to labor and delivery, c/o uc's every 2 minutes.  efm and toco applied.  Pt states she has been receiving care in [place] and is here to visit family in [place].  Pt states she was planning on going back to [place] tomorrow.  Vss.  Pt states she has had one term vaginal delivery and she has had 3 miscarriages.  Pt states she has had 4 ultrasounds during the pregnancy and they have all been normal.  sve 3/60/-2.  Report to dr. Colon.     0145: dr. Colon at , discuss poc    0215: rn at , sve /-2. Report to dr. Colon.  Orders to admit to labor and delivery. Iv started. Report to jay jay owen rn.  Pt moved to [facility]

## 2019-11-14 NOTE — PROGRESS NOTES
Patient admitted to room 343. Bedside report received from ROXANNE Coronel. Infant in NBN. Assessment complete. Plan of care discussed. Patient will request pain medication as needed. Patient oriented to room and educated on call light and emergency light. Call light within reach. Will continue to monitor.

## 2019-11-15 ENCOUNTER — TELEPHONE (OUTPATIENT)
Dept: OBGYN | Facility: CLINIC | Age: 24
End: 2019-11-15

## 2019-11-15 PROCEDURE — 700102 HCHG RX REV CODE 250 W/ 637 OVERRIDE(OP): Performed by: STUDENT IN AN ORGANIZED HEALTH CARE EDUCATION/TRAINING PROGRAM

## 2019-11-15 PROCEDURE — 770002 HCHG ROOM/CARE - OB PRIVATE (112)

## 2019-11-15 PROCEDURE — A9270 NON-COVERED ITEM OR SERVICE: HCPCS | Performed by: STUDENT IN AN ORGANIZED HEALTH CARE EDUCATION/TRAINING PROGRAM

## 2019-11-15 RX ADMIN — IBUPROFEN 600 MG: 600 TABLET ORAL at 16:53

## 2019-11-15 RX ADMIN — ACETAMINOPHEN 650 MG: 325 TABLET, FILM COATED ORAL at 16:53

## 2019-11-15 RX ADMIN — OXYCODONE HYDROCHLORIDE AND ACETAMINOPHEN 1 TABLET: 5; 325 TABLET ORAL at 22:34

## 2019-11-15 RX ADMIN — ACETAMINOPHEN 650 MG: 325 TABLET, FILM COATED ORAL at 09:19

## 2019-11-15 RX ADMIN — IBUPROFEN 600 MG: 600 TABLET ORAL at 05:50

## 2019-11-15 ASSESSMENT — EDINBURGH POSTNATAL DEPRESSION SCALE (EPDS)
I HAVE BEEN ANXIOUS OR WORRIED FOR NO GOOD REASON: NO, NOT AT ALL
I HAVE BLAMED MYSELF UNNECESSARILY WHEN THINGS WENT WRONG: NO, NEVER
I HAVE FELT SAD OR MISERABLE: NO, NOT AT ALL
I HAVE FELT SCARED OR PANICKY FOR NO GOOD REASON: NO, NOT AT ALL
THINGS HAVE BEEN GETTING ON TOP OF ME: NO, I HAVE BEEN COPING AS WELL AS EVER
I HAVE BEEN SO UNHAPPY THAT I HAVE HAD DIFFICULTY SLEEPING: NOT AT ALL
I HAVE BEEN SO UNHAPPY THAT I HAVE BEEN CRYING: NO, NEVER
I HAVE LOOKED FORWARD WITH ENJOYMENT TO THINGS: AS MUCH AS I EVER DID
THE THOUGHT OF HARMING MYSELF HAS OCCURRED TO ME: NEVER
I HAVE BEEN ABLE TO LAUGH AND SEE THE FUNNY SIDE OF THINGS: AS MUCH AS I ALWAYS COULD

## 2019-11-15 NOTE — PROGRESS NOTES
Assumed care. Assessment completed, VSS. Fundus firm, lochia scant. Pt. Ambulating and voiding. Pt. Requests pain medication as scheduled.POC discussed, all questions answered. Encouraged to call with needs.

## 2019-11-15 NOTE — LACTATION NOTE
This note was copied from a baby's chart.  MOB reports breastfeeding going well. Info given on THC and breastfeeding with the recommendation to not breastfeed if her intent is to continue to use THC. MOB states that she will not smoke THC when breastfeeding. Outpatient info on the Breastfeeding Circles given with encouragement to come to the group. Instruct to call for assessment of the latch when infant is feeding. MOB voices understanding.

## 2019-11-15 NOTE — PROGRESS NOTES
[facility] POSTPARTUM PROGRESS NOTE    Name:   Ra Ledezma Js   Date/Time:  11/15/2019 6:44 AM  Gestational Age:  39w2d  Admit Date:   2019  Admitting Dx:   Pregnancy  Indication for care in labor or delivery    PPD#1 s/p  of baby girl.    S:  Abdominal pain Well-controlled  Ambulating   yes  Tolerating PO  yes  Flatus    yes  Bleeding   light  Voiding   yes   Dizziness   no  Breast feeding  yes  Breast tenderness  no    O:  Vitals:    19 1000 19 1400 19 1800 11/15/19 0600   BP: 119/68 109/82 117/78 (!) 96/62   Pulse: 66 66 (!) 58 74   Resp: 18 18 18 17   Temp: 37.2 °C (98.9 °F) 36.9 °C (98.4 °F) 37.2 °C (98.9 °F) 36.6 °C (97.8 °F)   TempSrc: Temporal Temporal Temporal Temporal   SpO2: 100% 100% 99% 100%   Weight:       Height:         No intake or output data in the 24 hours ending 11/15/19 0644  General: No acute distress, resting comfortably in bed.  HEENT: normocephalic, nontraumatic, EOMI  Cardiovascular: Heart RRR, no rubs or gallops.  Respiratory: symmetric chest expansion, lungs CTA bilaterally with no wheezes rales or rhonci  Abdomen: soft, mildly tender, fundus firm, +BS  Genitourinary: lochia light, denies excessive vaginal bleeding  Musculoskeletal: strength 5/5 in four extremities, no calf tenderness, no peripheral edema  Neuro: no focal deficits noted    Recent Labs     19  0220 19  1325   WBC 12.9* 16.3*   RBC 4.39 3.81*   HEMOGLOBIN 12.3 10.8*   HEMATOCRIT 39.7 32.7*   MCV 90.4 85.8   MCH 28.0 28.3   RDW 43.6 40.1   PLATELETCT 199 167   MPV 12.5 12.7   NEUTSPOLYS 77.10*  --    LYMPHOCYTES 13.40*  --    MONOCYTES 8.50  --    EOSINOPHILS 0.20  --    BASOPHILS 0.20  --      No results for input(s): SODIUM, POTASSIUM, CHLORIDE, CO2, GLUCOSE, BUN, CPKTOTAL in the last 72 hours.    Current Meds:   Current Facility-Administered Medications   Medication Dose Frequency Provider Last Rate Last Dose   • ondansetron (ZOFRAN ODT) dispertab 4 mg  4 mg Q6HRS PRN Hardeep TORRES  SINCERE Colon        Or   • ondansetron (ZOFRAN) syringe/vial injection 4 mg  4 mg Q6HRS PRN Hardeep Colon M.D.   4 mg at 19 0506   • oxytocin (PITOCIN) infusion (for postpartum)   mL/hr Continuous Hardeep Colon M.D. 125 mL/hr at 19 0430 125 mL/hr at 19 0430   • ibuprofen (MOTRIN) tablet 600 mg  600 mg Q6HRS PRN Hardeep Colon M.D.   600 mg at 11/15/19 0550   • acetaminophen (TYLENOL) tablet 325 mg  325 mg Q4HRS PRN Hardeep Colon M.D.       • acetaminophen (TYLENOL) tablet 650 mg  650 mg Q4HRS PRN Hardeep Colon M.D.   650 mg at 199   • LR infusion   PRN Homar Moore, D.O.       • PRN oxytocin (PITOCIN) (20 Units/1000 mL) PRN for excessive uterine bleeding - See Admin Instr  125-999 mL/hr Once PRN Homar Coronadost, D.O.       • miSOPROStol (CYTOTEC) tablet 600 mcg  600 mcg Once PRN Homar Coronadost, D.O.       • oxyCODONE-acetaminophen (PERCOCET) 5-325 MG per tablet 1 Tab  1 Tab Once Hardeep Colon M.D.   Stopped at 11/15/19 0000   Last reviewed on 2019  4:05 AM by Berna Delcid R.N.        A:   24 y.o. female  at 39w2d PPD#1 s/p  of baby girl. She is recovering well and does not have any complaints this morning. There is a CPS case that is pending, and GBS was unknown, so baby will be here at least until tomorrow.     P:  Routine postpartum care, continue pain management, encourage ambulation, anticipate DC PPD#2.    Hardeep Colon M.D.

## 2019-11-15 NOTE — TELEPHONE ENCOUNTER
Post Partum called requesting PNV for pt  Spoke with midwife Nasreen Tran and notified her, she will send Rx

## 2019-11-15 NOTE — PROGRESS NOTES
Notified  that pt would like something stronger for her pain. Orders were to administer tylenol and if it doesn't work to administer a one time dose of 5mg of oxycodone.

## 2019-11-15 NOTE — LACTATION NOTE
This note was copied from a baby's chart.  MOB reports infant latching well and denies problems or needs @this time. States she  her first for 2 years. Encourage to call for assessment of latch or assistance as needed.

## 2019-11-15 NOTE — CARE PLAN
Problem: Communication  Goal: The ability to communicate needs accurately and effectively will improve  Outcome: PROGRESSING AS EXPECTED  Note:   Pt educated on use of call light and white board for communication, pt verbalizes understanding and times of rounding.      Problem: Pain Management  Goal: Pain level will decrease to patient's comfort goal  Outcome: PROGRESSING SLOWER THAN EXPECTED  Note:   Pt has been medicated around the clock; pt provided with a k pack and heat pads. Will continue to monitor pain.

## 2019-11-16 VITALS
RESPIRATION RATE: 18 BRPM | WEIGHT: 120 LBS | HEART RATE: 55 BPM | BODY MASS INDEX: 23.56 KG/M2 | SYSTOLIC BLOOD PRESSURE: 110 MMHG | TEMPERATURE: 98.6 F | DIASTOLIC BLOOD PRESSURE: 66 MMHG | OXYGEN SATURATION: 100 % | HEIGHT: 60 IN

## 2019-11-16 RX ORDER — IBUPROFEN 600 MG/1
600 TABLET ORAL EVERY 6 HOURS PRN
Qty: 30 TAB | Refills: 0 | Status: SHIPPED | OUTPATIENT
Start: 2019-11-16

## 2019-11-16 NOTE — DISCHARGE SUMMARY
Discharge Summary:      Ra Weinstein    Admit Date:   2019  Discharge Date:  2019     Admitting diagnosis:  Pregnancy  Indication for care in labor or delivery  Discharge Diagnosis: Status post vaginal, spontaneous.  Pregnancy Complications: limited care in [place]  Tubal Ligation:  no        History:  Past Medical History:   Diagnosis Date   • Asthma    • Hypertension     preeclampsia with previous pregnancy     OB History    Para Term  AB Living   5 2 1   3 2   SAB TAB Ectopic Molar Multiple Live Births           0 1      # Outcome Date GA Lbr Zach/2nd Weight Sex Delivery Anes PTL Lv   5 Term 19 39w2d / 00:05 2.59 kg (5 lb 11.4 oz) F Vag-Spont None N JOE   4 AB            3 AB            2 AB            1 Para                 Patient has no known allergies.  There are no active problems to display for this patient.       Hospital Course:   24 y.o. , now para 3, was admitted with the above mentioned diagnosis, underwent Active Labor, vaginal, spontaneous. Patient postpartum course was unremarkable, with progressive advancement in diet , ambulation and toleration of oral analgesia. Patient without complaints today and desires discharge.      Vitals:    19 1800 11/15/19 0600 11/15/19 1800 19 0600   BP: 117/78 (!) 96/62 112/67 110/66   Pulse: (!) 58 74 (!) 52 (!) 55   Resp: 18 17 20 18   Temp: 37.2 °C (98.9 °F) 36.6 °C (97.8 °F) 36.7 °C (98 °F) 37 °C (98.6 °F)   TempSrc: Temporal Temporal Temporal Temporal   SpO2: 99% 100% 97% 100%   Weight:       Height:           Current Facility-Administered Medications   Medication Dose   • ondansetron (ZOFRAN ODT) dispertab 4 mg  4 mg    Or   • ondansetron (ZOFRAN) syringe/vial injection 4 mg  4 mg   • oxytocin (PITOCIN) infusion (for postpartum)   mL/hr   • ibuprofen (MOTRIN) tablet 600 mg  600 mg   • acetaminophen (TYLENOL) tablet 325 mg  325 mg   • acetaminophen (TYLENOL) tablet 650 mg  650 mg   • LR  infusion     • PRN oxytocin (PITOCIN) (20 Units/1000 mL) PRN for excessive uterine bleeding - See Admin Instr  125-999 mL/hr   • miSOPROStol (CYTOTEC) tablet 600 mcg  600 mcg       Exam:  Breast Exam: negative  Abdomen: Abdomen soft, non-tender. BS normal. No masses,  No organomegaly  Fundus Non Tender: yes  Incision: none  Perineum: perineum intact  Extremity: extremities, peripheral pulses and reflexes normal     Labs:  Recent Labs     11/14/19  0220 11/14/19  1325   WBC 12.9* 16.3*   RBC 4.39 3.81*   HEMOGLOBIN 12.3 10.8*   HEMATOCRIT 39.7 32.7*   MCV 90.4 85.8   MCH 28.0 28.3   MCHC 31.0* 33.0*   RDW 43.6 40.1   PLATELETCT 199 167   MPV 12.5 12.7        Activity:   Discharge to home  Pelvic Rest x 6 weeks    Assessment:  normal postpartum course  Discharge Assessment: No areas of skin breakdown/redness; surgical incision intact/healing     [facility]: [facility] in 5 weeks for vaginal ; 1 week for incision check.      Discharge Meds:   Current Outpatient Medications   Medication Sig Dispense Refill   • ibuprofen (MOTRIN) 600 MG Tab Take 1 Tab by mouth every 6 hours as needed (For cramping after delivery; do not give if patient is receiving ketorolac (Toradol)). 30 Tab 0       Karoline Booker, P.A.

## 2019-11-16 NOTE — LACTATION NOTE
This note was copied from a baby's chart.  Encouraged to reduce pacifier use until baby's next weight check. Her milk is coming in and she reports that she may pump and supplement with expressed milk.Written information for breast feeding support circles.

## 2019-11-16 NOTE — DISCHARGE INSTRUCTIONS

## 2019-11-16 NOTE — CARE PLAN
Problem: Communication  Goal: The ability to communicate needs accurately and effectively will improve  Outcome: PROGRESSING AS EXPECTED  Note:   Patient has been ambulating in the room, taking adequate PO fluids and voiding. Breastfeeding infant. All questions and concerns answered.      Problem: Pain Management  Goal: Pain level will decrease to patient's comfort goal  Outcome: PROGRESSING AS EXPECTED  Note:   Patient like to call for PRN pain medication as needed.

## 2021-12-14 ENCOUNTER — OFFICE VISIT (OUTPATIENT)
Dept: URGENT CARE | Facility: CLINIC | Age: 26
End: 2021-12-14

## 2021-12-14 ENCOUNTER — HOSPITAL ENCOUNTER (OUTPATIENT)
Facility: MEDICAL CENTER | Age: 26
End: 2021-12-14
Attending: NURSE PRACTITIONER
Payer: COMMERCIAL

## 2021-12-14 VITALS
WEIGHT: 103 LBS | HEIGHT: 60 IN | BODY MASS INDEX: 20.22 KG/M2 | TEMPERATURE: 97.5 F | DIASTOLIC BLOOD PRESSURE: 62 MMHG | RESPIRATION RATE: 16 BRPM | OXYGEN SATURATION: 99 % | SYSTOLIC BLOOD PRESSURE: 94 MMHG | HEART RATE: 85 BPM

## 2021-12-14 DIAGNOSIS — J45.21 MILD INTERMITTENT ASTHMA WITH ACUTE EXACERBATION: Primary | ICD-10-CM

## 2021-12-14 PROCEDURE — 99204 OFFICE O/P NEW MOD 45 MIN: CPT | Performed by: NURSE PRACTITIONER

## 2021-12-14 PROCEDURE — U0005 INFEC AGEN DETEC AMPLI PROBE: HCPCS

## 2021-12-14 PROCEDURE — U0003 INFECTIOUS AGENT DETECTION BY NUCLEIC ACID (DNA OR RNA); SEVERE ACUTE RESPIRATORY SYNDROME CORONAVIRUS 2 (SARS-COV-2) (CORONAVIRUS DISEASE [COVID-19]), AMPLIFIED PROBE TECHNIQUE, MAKING USE OF HIGH THROUGHPUT TECHNOLOGIES AS DESCRIBED BY CMS-2020-01-R: HCPCS

## 2021-12-14 RX ORDER — ALBUTEROL SULFATE 2.5 MG/3ML
SOLUTION RESPIRATORY (INHALATION)
Qty: 3 ML | Refills: 0 | Status: SHIPPED | OUTPATIENT
Start: 2021-12-14

## 2021-12-14 RX ORDER — ALBUTEROL SULFATE 2.5 MG/3ML
SOLUTION RESPIRATORY (INHALATION)
COMMUNITY
Start: 2021-12-11 | End: 2021-12-14 | Stop reason: SDUPTHER

## 2021-12-14 RX ORDER — ALBUTEROL SULFATE 90 UG/1
2 AEROSOL, METERED RESPIRATORY (INHALATION) EVERY 6 HOURS PRN
Qty: 8.5 G | Refills: 0 | Status: SHIPPED | OUTPATIENT
Start: 2021-12-14

## 2021-12-14 RX ORDER — ALBUTEROL SULFATE 2.5 MG/3ML
SOLUTION RESPIRATORY (INHALATION)
COMMUNITY
Start: 2021-12-11

## 2021-12-15 DIAGNOSIS — J45.21 MILD INTERMITTENT ASTHMA WITH ACUTE EXACERBATION: ICD-10-CM

## 2021-12-15 PROBLEM — M25.532 LEFT WRIST PAIN: Status: ACTIVE | Noted: 2021-04-05

## 2021-12-15 PROBLEM — Z87.51 HISTORY OF PRETERM DELIVERY: Status: ACTIVE | Noted: 2019-10-30

## 2021-12-15 PROBLEM — O09.33 INSUFFICIENT PRENATAL CARE IN THIRD TRIMESTER: Status: ACTIVE | Noted: 2019-11-04

## 2021-12-15 PROBLEM — Z87.59 HISTORY OF PRE-ECLAMPSIA: Status: ACTIVE | Noted: 2019-10-30

## 2021-12-15 PROBLEM — O09.90 SUPERVISION OF HIGH RISK PREGNANCY, ANTEPARTUM: Status: ACTIVE | Noted: 2019-10-30

## 2021-12-15 PROBLEM — R10.31 RIGHT GROIN PAIN: Status: ACTIVE | Noted: 2021-12-15

## 2021-12-15 PROBLEM — R00.0 TACHYCARDIA: Status: ACTIVE | Noted: 2019-10-30

## 2021-12-15 PROBLEM — M79.645 FINGER PAIN, LEFT: Status: ACTIVE | Noted: 2021-04-05

## 2021-12-15 LAB
COVID ORDER STATUS COVID19: NORMAL
SARS-COV-2 RNA RESP QL NAA+PROBE: NOTDETECTED
SPECIMEN SOURCE: NORMAL

## 2021-12-15 ASSESSMENT — ENCOUNTER SYMPTOMS
WHEEZING: 0
EYE PAIN: 0
PND: 0
CHILLS: 0
VOMITING: 0
FREQUENT THROAT CLEARING: 0
DIFFICULTY BREATHING: 0
NAUSEA: 0
MYALGIAS: 0
SHORTNESS OF BREATH: 1
FEVER: 0
CHEST TIGHTNESS: 1
HEMOPTYSIS: 0
DIZZINESS: 0
RHINORRHEA: 0
SORE THROAT: 0
HOARSE VOICE: 0
COUGH: 1
SPUTUM PRODUCTION: 0

## 2021-12-16 NOTE — PROGRESS NOTES
Subjective:   Ra Weinstein is a 26 y.o. female who presents for Shortness of Breath (Has asthma, medications were left in [place], needs medication)      Asthma  She complains of chest tightness, cough and shortness of breath. There is no difficulty breathing, frequent throat clearing, hemoptysis, hoarse voice, sputum production or wheezing. This is a new problem. The current episode started in the past 7 days. The problem occurs constantly. The problem has been unchanged. The cough is non-productive. Pertinent negatives include no chest pain, ear pain, fever, myalgias, nasal congestion, PND, postnasal drip, rhinorrhea, sneezing or sore throat. Her symptoms are aggravated by nothing. Her symptoms are alleviated by nothing. She reports no improvement on treatment. Her symptoms are not alleviated by beta-agonist. There are no known risk factors for lung disease. Her past medical history is significant for asthma (requesting refills of nebulizer medication).       Review of Systems   Constitutional: Negative for chills and fever.   HENT: Negative for ear pain, hoarse voice, postnasal drip, rhinorrhea, sneezing and sore throat.    Eyes: Negative for pain.   Respiratory: Positive for cough and shortness of breath. Negative for hemoptysis, sputum production and wheezing.    Cardiovascular: Negative for chest pain and PND.   Gastrointestinal: Negative for nausea and vomiting.   Genitourinary: Negative for hematuria.   Musculoskeletal: Negative for myalgias.   Skin: Negative for rash.   Neurological: Negative for dizziness.       Medications:    • albuterol Aers  • albuterol Nebu  • ibuprofen Tabs    Allergies: Patient has no known allergies.    Problem List: Ra Weinstein does not have a problem list on file.    Surgical History:  No past surgical history on file.    Past Social Hx: Ra Weinstein  reports that she has been smoking cigarettes. She has been smoking about 0.00 packs per day. She  has never used smokeless tobacco. She reports current drug use. She reports that she does not drink alcohol.     Past Family Hx:  Ra Weinstein family history is not on file.     Problem list, medications, and allergies reviewed by myself today in Epic.     Objective:     BP (!) 94/62   Pulse 85   Temp 36.4 °C (97.5 °F) (Temporal)   Resp 16   Ht 1.524 m (5')   Wt 46.7 kg (103 lb)   SpO2 99%   BMI 20.12 kg/m²     Physical Exam  Vitals and nursing note reviewed.   Constitutional:       General: She is not in acute distress.     Appearance: She is well-developed.   HENT:      Head: Normocephalic and atraumatic.      Right Ear: Tympanic membrane and external ear normal.      Left Ear: Tympanic membrane and external ear normal.      Nose: Nose normal.      Right Sinus: No maxillary sinus tenderness or frontal sinus tenderness.      Left Sinus: No maxillary sinus tenderness or frontal sinus tenderness.      Mouth/Throat:      Mouth: Mucous membranes are moist.      Pharynx: Uvula midline. No posterior oropharyngeal erythema.      Tonsils: No tonsillar exudate or tonsillar abscesses.   Eyes:      General:         Right eye: No discharge.         Left eye: No discharge.      Conjunctiva/sclera: Conjunctivae normal.   Cardiovascular:      Rate and Rhythm: Normal rate.   Pulmonary:      Effort: Pulmonary effort is normal. No respiratory distress.      Breath sounds: Normal breath sounds.   Abdominal:      General: There is no distension.   Musculoskeletal:         General: Normal range of motion.   Skin:     General: Skin is warm and dry.   Neurological:      General: No focal deficit present.      Mental Status: She is alert and oriented to person, place, and time. Mental status is at baseline.      Gait: Gait (gait at baseline) normal.   Psychiatric:         Judgment: Judgment normal.         Assessment/Plan:     Diagnosis and associated orders:     1. Mild intermittent asthma with acute exacerbation   albuterol (PROVENTIL) 2.5mg/3ml Nebu Soln solution for nebulization    albuterol 108 (90 Base) MCG/ACT Aero Soln inhalation aerosol    SARS-CoV-2 PCR (24 hour In-House): Collect NP swab in [place]      Comments/MDM:   Is an acute exacerbation of the chronic problem  The patient's presenting symptoms and exam findings most likely are due to a viral etiology.     Test for COVID-19 via PCR. Result will be reviewed by myself. We will call/message back for results and appropriate further instructions. Instructed to sign up for [employer] if they have not already. Result will be automatically released to [employer] application for patient review. I will be sending a message with Next Step Instructions to [employer] soon after resulted.   Symptomatic and supportive care:   Plenty of oral hydration and rest   Over the counter cough suppressant as directed.  Tylenol or ibuprofen for pain and fever as directed.   Warm salt water gargles for sore throat, soft foods, cool liquids.   Saline nasal spray and Flonase as a decongestant.   Infection control measures at home. Stay away from people, Hand washing, covering sneeze/cough, disinfect surfaces.   Remain home from work, school, and other populated environments. Work note provided with information of quarantine measures per CDC guidelines.   Overall, the patient is well-appearing. They are not hypoxic, afebrile, and a normal pulmonary exam.      •              Please note that this dictation was created using voice recognition software. I have made a reasonable attempt to correct obvious errors, but I expect that there are errors of grammar and possibly content that I did not discover before finalizing the note.    This note was electronically signed by Sukhwinder JARAMILLO.

## 2022-01-07 ENCOUNTER — OFFICE VISIT (OUTPATIENT)
Dept: URGENT CARE | Facility: PHYSICIAN GROUP | Age: 27
End: 2022-01-07

## 2022-01-07 VITALS
SYSTOLIC BLOOD PRESSURE: 102 MMHG | RESPIRATION RATE: 16 BRPM | HEART RATE: 74 BPM | OXYGEN SATURATION: 100 % | TEMPERATURE: 98.1 F | BODY MASS INDEX: 18.33 KG/M2 | HEIGHT: 65 IN | DIASTOLIC BLOOD PRESSURE: 62 MMHG | WEIGHT: 110 LBS

## 2022-01-07 DIAGNOSIS — Z34.90 PREGNANCY, UNSPECIFIED GESTATIONAL AGE: ICD-10-CM

## 2022-01-07 LAB
INT CON NEG: NORMAL
INT CON POS: NORMAL
POC URINE PREGNANCY TEST: POSITIVE

## 2022-01-07 PROCEDURE — 81025 URINE PREGNANCY TEST: CPT | Performed by: PHYSICIAN ASSISTANT

## 2022-01-07 PROCEDURE — 99213 OFFICE O/P EST LOW 20 MIN: CPT | Performed by: PHYSICIAN ASSISTANT

## 2022-01-07 ASSESSMENT — ENCOUNTER SYMPTOMS
FLANK PAIN: 0
FEVER: 0
DIZZINESS: 0
CHILLS: 0
NAUSEA: 1
ABDOMINAL PAIN: 0
HEADACHES: 0
BRUISES/BLEEDS EASILY: 0
PALPITATIONS: 0

## 2022-01-07 NOTE — PROGRESS NOTES
Subjective:   Ra Weinstein is a 26 y.o. female who presents for Body Aches (took a pregnancy test and was positive wants to make sure she is ok. )      HPI:  This is a very pleasant 26-year-old female  presenting to the clinic for a pregnancy test. She took a pregnancy test yesterday which was positive at home. LMP: 2021 patient states she has had intermittent bouts of nausea for the last few days. She is also experiencing intermittent bouts of lower abdominal cramping. Cramping seems to come on every few days and last a few seconds. Denies any sharp stabbing pains. No tenderness to palpation. Not experiencing any pain in clinic. No episodes of emesis. Denies any vaginal discharge. Denies any lightheadedness. Currently does not have a PCP in clinic. She has not established with a OB/GYN.    Review of Systems   Constitutional: Negative for chills, fever and malaise/fatigue.   Cardiovascular: Negative for chest pain and palpitations.   Gastrointestinal: Positive for nausea. Negative for abdominal pain (Intermittent bouts of lower abdominal cramping.).   Genitourinary: Negative for dysuria, flank pain, frequency, hematuria and urgency.   Neurological: Negative for dizziness and headaches.   Endo/Heme/Allergies: Does not bruise/bleed easily.       Medications:    • albuterol Aers  • albuterol Nebu  • ibuprofen Tabs    Allergies: Patient has no known allergies.    Problem List: Ra Weinstein does not have any pertinent problems on file.    Surgical History:  No past surgical history on file.    Past Social Hx: Ra Weinstein  reports that she has been smoking cigarettes. She has been smoking about 0.00 packs per day. She has never used smokeless tobacco. She reports current drug use. She reports that she does not drink alcohol.     Past Family Hx:  Ra Weinstein family history is not on file.     Problem list, medications, and allergies reviewed by myself today in Epic.      "Objective:     /62 (BP Location: Left arm, Patient Position: Sitting, BP Cuff Size: Adult)   Pulse 74   Temp 36.7 °C (98.1 °F) (Temporal)   Resp 16   Ht 1.651 m (5' 5\")   Wt 49.9 kg (110 lb)   LMP 11/12/2021 (Exact Date)   SpO2 100%   BMI 18.30 kg/m²     Physical Exam  Constitutional:       General: She is not in acute distress.     Appearance: Normal appearance. She is not ill-appearing, toxic-appearing or diaphoretic.   HENT:      Head: Normocephalic and atraumatic.   Eyes:      Conjunctiva/sclera: Conjunctivae normal.   Cardiovascular:      Rate and Rhythm: Normal rate and regular rhythm.      Pulses: Normal pulses.      Heart sounds: Normal heart sounds.   Pulmonary:      Effort: Pulmonary effort is normal.      Breath sounds: Normal breath sounds.   Abdominal:      Tenderness: There is no abdominal tenderness. There is no guarding.      Comments: No tenderness to palpation on abdominal exam. No rebound, rigidity or guarding.   Musculoskeletal:      Cervical back: Normal range of motion.   Neurological:      General: No focal deficit present.      Mental Status: She is alert and oriented to person, place, and time. Mental status is at baseline.       POCT pregnancy: Positive    Assessment/Plan:     Comments/MDM:     • Patient has a positive qualitative hCG in clinic today.  We will send out for quantitative hCG at this time.  Currently not experiencing any abdominal pain.  No tenderness to palpation.  Strict ER precautions discussed for any lower abdominal pain.  Placed referral for the patient to follow-up and establish care with an OB/GYN.   Diagnosis and associated orders:     1. Pregnancy, unspecified gestational age  POCT Pregnancy    HCG QUANTITATIVE    Referral to OB/Gyn              Differential diagnosis, natural history, supportive care, and indications for immediate follow-up discussed.    I personally reviewed prior external notes and test results pertinent to today's visit.     Advised " the patient to follow-up with the primary care physician for recheck, reevaluation, and consideration of further management.    Please note that this dictation was created using voice recognition software. I have made reasonable attempt to correct obvious errors, but I expect that there are errors of grammar and possibly content that I did not discover before finalizing the note.    This note was electronically signed by ALIRIO Samaniego PA-C

## 2022-01-07 NOTE — PATIENT INSTRUCTIONS
First Trimester of Pregnancy    The first trimester of pregnancy is from week 1 until the end of week 13 (months 1 through 3). During this time, your baby will begin to develop inside you. At 6-8 weeks, the eyes and face are formed, and the heartbeat can be seen on ultrasound. At the end of 12 weeks, all the baby's organs are formed. Prenatal care is all the medical care you receive before the birth of your baby. Make sure you get good prenatal care and follow all of your doctor's instructions.  Follow these instructions at home:  Medicines  · Take over-the-counter and prescription medicines only as told by your doctor. Some medicines are safe and some medicines are not safe during pregnancy.  · Take a prenatal vitamin that contains at least 600 micrograms (mcg) of folic acid.  · If you have trouble pooping (constipation), take medicine that will make your stool soft (stool softener) if your doctor approves.  Eating and drinking    · Eat regular, healthy meals.  · Your doctor will tell you the amount of weight gain that is right for you.  · Avoid raw meat and uncooked cheese.  · If you feel sick to your stomach (nauseous) or throw up (vomit):  ? Eat 4 or 5 small meals a day instead of 3 large meals.  ? Try eating a few soda crackers.  ? Drink liquids between meals instead of during meals.  · To prevent constipation:  ? Eat foods that are high in fiber, like fresh fruits and vegetables, whole grains, and beans.  ? Drink enough fluids to keep your pee (urine) clear or pale yellow.  Activity  · Exercise only as told by your doctor. Stop exercising if you have cramps or pain in your lower belly (abdomen) or low back.  · Do not exercise if it is too hot, too humid, or if you are in a place of great height (high altitude).  · Try to avoid standing for long periods of time. Move your legs often if you must  one place for a long time.  · Avoid heavy lifting.  · Wear low-heeled shoes. Sit and stand up  straight.  · You can have sex unless your doctor tells you not to.  Relieving pain and discomfort  · Wear a good support bra if your breasts are sore.  · Take warm water baths (sitz baths) to soothe pain or discomfort caused by hemorrhoids. Use hemorrhoid cream if your doctor says it is okay.  · Rest with your legs raised if you have leg cramps or low back pain.  · If you have puffy, bulging veins (varicose veins) in your legs:  ? Wear support hose or compression stockings as told by your doctor.  ? Raise (elevate) your feet for 15 minutes, 3-4 times a day.  ? Limit salt in your food.  Prenatal care  · Schedule your prenatal visits by the twelfth week of pregnancy.  · Write down your questions. Take them to your prenatal visits.  · Keep all your prenatal visits as told by your doctor. This is important.  Safety  · Wear your seat belt at all times when driving.  · Make a list of emergency phone numbers. The list should include numbers for family, friends, the hospital, and police and fire departments.  General instructions  · Ask your doctor for a referral to a local prenatal class. Begin classes no later than at the start of month 6 of your pregnancy.  · Ask for help if you need counseling or if you need help with nutrition. Your doctor can give you advice or tell you where to go for help.  · Do not use hot tubs, steam rooms, or saunas.  · Do not douche or use tampons or scented sanitary pads.  · Do not cross your legs for long periods of time.  · Avoid all herbs and alcohol. Avoid drugs that are not approved by your doctor.  · Do not use any tobacco products, including cigarettes, chewing tobacco, and electronic cigarettes. If you need help quitting, ask your doctor. You may get counseling or other support to help you quit.  · Avoid cat litter boxes and soil used by cats. These carry germs that can cause birth defects in the baby and can cause a loss of your baby (miscarriage) or stillbirth.  · Visit your dentist.  At home, brush your teeth with a soft toothbrush. Be gentle when you floss.  Contact a doctor if:  · You are dizzy.  · You have mild cramps or pressure in your lower belly.  · You have a nagging pain in your belly area.  · You continue to feel sick to your stomach, you throw up, or you have watery poop (diarrhea).  · You have a bad smelling fluid coming from your vagina.  · You have pain when you pee (urinate).  · You have increased puffiness (swelling) in your face, hands, legs, or ankles.  Get help right away if:  · You have a fever.  · You are leaking fluid from your vagina.  · You have spotting or bleeding from your vagina.  · You have very bad belly cramping or pain.  · You gain or lose weight rapidly.  · You throw up blood. It may look like coffee grounds.  · You are around people who have [place] measles, fifth disease, or chickenpox.  · You have a very bad headache.  · You have shortness of breath.  · You have any kind of trauma, such as from a fall or a car accident.  Summary  · The first trimester of pregnancy is from week 1 until the end of week 13 (months 1 through 3).  · To take care of yourself and your unborn baby, you will need to eat healthy meals, take medicines only if your doctor tells you to do so, and do activities that are safe for you and your baby.  · Keep all follow-up visits as told by your doctor. This is important as your doctor will have to ensure that your baby is healthy and growing well.  This information is not intended to replace advice given to you by your health care provider. Make sure you discuss any questions you have with your health care provider.  Document Released: 06/05/2009 Document Revised: 04/09/2020 Document Reviewed: 12/26/2017  [employer] Patient Education © 2020 [employer]

## 2022-06-26 NOTE — ED TRIAGE NOTES
Chief Complaint   Patient presents with   • Cold Symptoms     runny nose, congestion. feels like her asthma flared up.    • Medication Refill     out of albuterol inhaler   • Pregnancy     36 weeks     Pt to triage for above. NAD noted, VSS, denies fevers.    /69   Pulse 93   Temp 36.3 °C (97.3 °F) (Temporal)   Resp 12   Ht 1.524 m (5')   Wt 60.5 kg (133 lb 6.1 oz)   LMP 02/12/2019   SpO2 100%   BMI 26.05 kg/m²     
[place]